# Patient Record
Sex: MALE
[De-identification: names, ages, dates, MRNs, and addresses within clinical notes are randomized per-mention and may not be internally consistent; named-entity substitution may affect disease eponyms.]

---

## 2020-11-04 ENCOUNTER — TRANSCRIPTION ENCOUNTER (OUTPATIENT)
Age: 60
End: 2020-11-04

## 2021-12-10 ENCOUNTER — APPOINTMENT (OUTPATIENT)
Dept: RHEUMATOLOGY | Facility: CLINIC | Age: 61
End: 2021-12-10

## 2023-08-01 ENCOUNTER — APPOINTMENT (OUTPATIENT)
Dept: GASTROENTEROLOGY | Facility: CLINIC | Age: 63
End: 2023-08-01

## 2024-08-15 ENCOUNTER — APPOINTMENT (OUTPATIENT)
Dept: NEUROLOGY | Facility: CLINIC | Age: 64
End: 2024-08-15

## 2024-08-15 VITALS
DIASTOLIC BLOOD PRESSURE: 68 MMHG | WEIGHT: 180 LBS | BODY MASS INDEX: 26.66 KG/M2 | HEART RATE: 67 BPM | SYSTOLIC BLOOD PRESSURE: 128 MMHG | HEIGHT: 69 IN | OXYGEN SATURATION: 97 %

## 2024-08-15 DIAGNOSIS — R56.9 UNSPECIFIED CONVULSIONS: ICD-10-CM

## 2024-08-15 DIAGNOSIS — R42 DIZZINESS AND GIDDINESS: ICD-10-CM

## 2024-08-15 PROCEDURE — 99204 OFFICE O/P NEW MOD 45 MIN: CPT

## 2024-08-15 PROCEDURE — G2211 COMPLEX E/M VISIT ADD ON: CPT

## 2024-08-15 NOTE — DISCUSSION/SUMMARY
[FreeTextEntry1] : The proposed plan includes ordering diagnostic tests, such as an MRI (including MRA to evaluate blood vessels), EEG to rule out seizures, and referral for vestibular therapy if dizziness persists. Additionally, the patient will be advised to take over-the-counter medications for headache management and follow up in a month after completing the tests.  - Plan : - Order MRI (including MRA) to evaluate the brain and blood vessels - Order EEG to rule out seizure disorder - Provide referral for vestibular therapy if dizziness persists - Recommend over-the-counter acetaminophen or ibuprofen for headache management - Follow-up appointment in one month after completing the tests

## 2024-08-15 NOTE — HISTORY OF PRESENT ILLNESS
[FreeTextEntry1] : This is a 64 year-old man with PMH MDS, diffuse large B-cell lymphoma who presents to the office today for evaluation of episodes of confusion/loss of consciousness, dizziness and abnormal sensation to head and right ear and headaches after hearing loud noises.  The patient reports feeling warmth inside the head and a sensation of something inside the ears. States this sensation generally occurs during or after feeling dizzy. The patient experiences dizziness when sitting as a passenger in a car. The patient also reports episodes of losing consciousness or becoming confused, with amnesia for these events lasting from one day up to two days. The patient denies any involuntary movements or body shaking during these episodes. Family History: Past Medical History: The patient reports a past eye injury where something dropped from the 6th floor and hit the left eye, causing abnormal eye shape/positioning. Impression: The healthcare provider suspects the dizziness may be related to vertigo or an inner ear issue, but wants to rule out any serious neurological conditions. The episodes of confusion and amnesia raise concern for possible seizures. - Review Of Systems : Review of Systems: The patient denies headaches with exertion or exercise, but reports headaches with loud noises, rating the pain as moderate in severity. The patient denies any other significant positive findings on review of systems.  December, January, 2 months and once in summer   warm sens in ear and head on right while used to get this while driving, dizziness eyes want to close feels like he needs water and warm 2-3 passing out took of clothes confused 1-2 days passed out unconsiousness took own life? confused  dizziness while driving loud noises cause headache    Subjective:

## 2024-08-15 NOTE — REASON FOR VISIT
[Initial Evaluation] : an initial evaluation [[employer] ] : provided by [employer]   [Interpreters_IDNumber] : [id number] [Interpreters_FullName] : Justin

## 2024-08-15 NOTE — REASON FOR VISIT
[Initial Evaluation] : an initial evaluation [[employer] ] : provided by [employer]   [Interpreters_IDNumber] : [id number] [Interpreters_FullName] : Justin

## 2024-08-29 ENCOUNTER — APPOINTMENT (OUTPATIENT)
Dept: NEUROLOGY | Facility: CLINIC | Age: 64
End: 2024-08-29

## 2024-08-29 PROCEDURE — 95816 EEG AWAKE AND DROWSY: CPT

## 2024-08-29 PROCEDURE — 93040 RHYTHM ECG WITH REPORT: CPT

## 2024-09-11 ENCOUNTER — OUTPATIENT (OUTPATIENT)
Dept: OUTPATIENT SERVICES | Facility: HOSPITAL | Age: 64
LOS: 1 days | End: 2024-09-11

## 2024-09-11 ENCOUNTER — APPOINTMENT (OUTPATIENT)
Dept: MRI IMAGING | Facility: CLINIC | Age: 64
End: 2024-09-11

## 2024-09-11 DIAGNOSIS — R56.9 UNSPECIFIED CONVULSIONS: ICD-10-CM

## 2024-09-11 DIAGNOSIS — R42 DIZZINESS AND GIDDINESS: ICD-10-CM

## 2024-09-12 RX ORDER — ALPRAZOLAM 0.5 MG/1
0.5 TABLET ORAL
Qty: 2 | Refills: 0 | Status: ACTIVE | COMMUNITY
Start: 2024-09-12 | End: 1900-01-01

## 2024-09-24 ENCOUNTER — APPOINTMENT (OUTPATIENT)
Dept: NEUROLOGY | Facility: CLINIC | Age: 64
End: 2024-09-24

## 2024-09-27 ENCOUNTER — APPOINTMENT (OUTPATIENT)
Dept: MRI IMAGING | Facility: CLINIC | Age: 64
End: 2024-09-27

## 2024-09-27 ENCOUNTER — OUTPATIENT (OUTPATIENT)
Dept: OUTPATIENT SERVICES | Facility: HOSPITAL | Age: 64
LOS: 1 days | End: 2024-09-27

## 2024-09-27 DIAGNOSIS — R42 DIZZINESS AND GIDDINESS: ICD-10-CM

## 2024-09-27 DIAGNOSIS — R56.9 UNSPECIFIED CONVULSIONS: ICD-10-CM

## 2024-10-01 ENCOUNTER — APPOINTMENT (OUTPATIENT)
Dept: NEUROLOGY | Facility: CLINIC | Age: 64
End: 2024-10-01

## 2025-04-13 ENCOUNTER — INPATIENT (INPATIENT)
Facility: HOSPITAL | Age: 65
LOS: 1 days | Discharge: HOME | DRG: 443 | End: 2025-04-15
Attending: HOSPITALIST | Admitting: EMERGENCY MEDICINE
Payer: MEDICARE

## 2025-04-13 VITALS
RESPIRATION RATE: 18 BRPM | TEMPERATURE: 98 F | HEART RATE: 92 BPM | OXYGEN SATURATION: 100 % | DIASTOLIC BLOOD PRESSURE: 84 MMHG | SYSTOLIC BLOOD PRESSURE: 138 MMHG

## 2025-04-13 LAB
ALBUMIN SERPL ELPH-MCNC: 2.6 G/DL — LOW (ref 3.3–5.2)
ALP SERPL-CCNC: 90 U/L — SIGNIFICANT CHANGE UP (ref 40–120)
ALT FLD-CCNC: 33 U/L — SIGNIFICANT CHANGE UP
AMMONIA BLD-MCNC: 222 UMOL/L — HIGH (ref 11–55)
ANION GAP SERPL CALC-SCNC: 9 MMOL/L — SIGNIFICANT CHANGE UP (ref 5–17)
ANISOCYTOSIS BLD QL: SLIGHT — SIGNIFICANT CHANGE UP
APTT BLD: 35.7 SEC — HIGH (ref 24.5–35.6)
AST SERPL-CCNC: 80 U/L — HIGH
BASOPHILS # BLD AUTO: 0.01 K/UL — SIGNIFICANT CHANGE UP (ref 0–0.2)
BASOPHILS # BLD MANUAL: 0 K/UL — SIGNIFICANT CHANGE UP (ref 0–0.2)
BASOPHILS NFR BLD AUTO: 0.5 % — SIGNIFICANT CHANGE UP (ref 0–2)
BASOPHILS NFR BLD MANUAL: 0 % — SIGNIFICANT CHANGE UP (ref 0–2)
BILIRUB SERPL-MCNC: 0.9 MG/DL — SIGNIFICANT CHANGE UP (ref 0.4–2)
BUN SERPL-MCNC: 11.3 MG/DL — SIGNIFICANT CHANGE UP (ref 8–20)
CALCIUM SERPL-MCNC: 8.3 MG/DL — LOW (ref 8.4–10.5)
CHLORIDE SERPL-SCNC: 109 MMOL/L — HIGH (ref 96–108)
CO2 SERPL-SCNC: 20 MMOL/L — LOW (ref 22–29)
CREAT SERPL-MCNC: 0.53 MG/DL — SIGNIFICANT CHANGE UP (ref 0.5–1.3)
EGFR: 110 ML/MIN/1.73M2 — SIGNIFICANT CHANGE UP
EGFR: 110 ML/MIN/1.73M2 — SIGNIFICANT CHANGE UP
EOSINOPHIL # BLD AUTO: 0.04 K/UL — SIGNIFICANT CHANGE UP (ref 0–0.5)
EOSINOPHIL # BLD MANUAL: 0.02 K/UL — SIGNIFICANT CHANGE UP (ref 0–0.5)
EOSINOPHIL NFR BLD AUTO: 1.9 % — SIGNIFICANT CHANGE UP (ref 0–6)
EOSINOPHIL NFR BLD MANUAL: 0.9 % — SIGNIFICANT CHANGE UP (ref 0–6)
GIANT PLATELETS BLD QL SMEAR: PRESENT
GLUCOSE SERPL-MCNC: 117 MG/DL — HIGH (ref 70–99)
HCT VFR BLD CALC: 32.6 % — LOW (ref 39–50)
HGB BLD-MCNC: 11.3 G/DL — LOW (ref 13–17)
IMM GRANULOCYTES # BLD AUTO: 0 K/UL — SIGNIFICANT CHANGE UP (ref 0–0.07)
IMM GRANULOCYTES NFR BLD AUTO: 0 % — SIGNIFICANT CHANGE UP (ref 0–0.9)
IMMATURE PLATELET FRACTION #: 1.6 K/UL — LOW (ref 3.9–12.5)
IMMATURE PLATELET FRACTION %: 2.7 % — SIGNIFICANT CHANGE UP (ref 1.6–7.1)
INR BLD: 1.34 RATIO — HIGH (ref 0.85–1.16)
LIDOCAIN IGE QN: 26 U/L — SIGNIFICANT CHANGE UP (ref 22–51)
LYMPHOCYTES # BLD AUTO: 0.69 K/UL — LOW (ref 1–3.3)
LYMPHOCYTES # BLD MANUAL: 0.27 K/UL — LOW (ref 1–3.3)
LYMPHOCYTES NFR BLD AUTO: 32.4 % — SIGNIFICANT CHANGE UP (ref 13–44)
LYMPHOCYTES NFR BLD MANUAL: 12.8 % — LOW (ref 13–44)
MACROCYTES BLD QL: SLIGHT — SIGNIFICANT CHANGE UP
MANUAL REACTIVE LYMPHOCYTES #: 0.04 K/UL — SIGNIFICANT CHANGE UP (ref 0–0.63)
MCHC RBC-ENTMCNC: 34.7 G/DL — SIGNIFICANT CHANGE UP (ref 32–36)
MCHC RBC-ENTMCNC: 34.7 PG — HIGH (ref 27–34)
MCV RBC AUTO: 100 FL — SIGNIFICANT CHANGE UP (ref 80–100)
MONOCYTES # BLD AUTO: 0.33 K/UL — SIGNIFICANT CHANGE UP (ref 0–0.9)
MONOCYTES # BLD MANUAL: 0.53 K/UL — SIGNIFICANT CHANGE UP (ref 0–0.9)
MONOCYTES NFR BLD AUTO: 15.5 % — HIGH (ref 2–14)
MONOCYTES NFR BLD MANUAL: 24.8 % — HIGH (ref 2–14)
NEUTROPHILS # BLD AUTO: 1.06 K/UL — LOW (ref 1.8–7.4)
NEUTROPHILS # BLD MANUAL: 1.27 K/UL — LOW (ref 1.8–7.4)
NEUTROPHILS NFR BLD AUTO: 49.7 % — SIGNIFICANT CHANGE UP (ref 43–77)
NEUTROPHILS NFR BLD MANUAL: 59.8 % — SIGNIFICANT CHANGE UP (ref 43–77)
NRBC # BLD AUTO: 0 K/UL — SIGNIFICANT CHANGE UP (ref 0–0)
NRBC # FLD: 0 K/UL — SIGNIFICANT CHANGE UP (ref 0–0)
NRBC BLD AUTO-RTO: 0 /100 WBCS — SIGNIFICANT CHANGE UP (ref 0–0)
PLAT MORPH BLD: NORMAL — SIGNIFICANT CHANGE UP
PLATELET # BLD AUTO: 58 K/UL — LOW (ref 150–400)
PMV BLD: 11.1 FL — SIGNIFICANT CHANGE UP (ref 7–13)
POLYCHROMASIA BLD QL SMEAR: SLIGHT — SIGNIFICANT CHANGE UP
POTASSIUM SERPL-MCNC: 5.3 MMOL/L — SIGNIFICANT CHANGE UP (ref 3.5–5.3)
POTASSIUM SERPL-SCNC: 5.3 MMOL/L — SIGNIFICANT CHANGE UP (ref 3.5–5.3)
PROT SERPL-MCNC: 7.6 G/DL — SIGNIFICANT CHANGE UP (ref 6.6–8.7)
PROTHROM AB SERPL-ACNC: 15.5 SEC — HIGH (ref 9.9–13.4)
RBC # BLD: 3.26 M/UL — LOW (ref 4.2–5.8)
RBC # FLD: 14.2 % — SIGNIFICANT CHANGE UP (ref 10.3–14.5)
RBC BLD AUTO: ABNORMAL
SMUDGE CELLS # BLD: PRESENT
SODIUM SERPL-SCNC: 137 MMOL/L — SIGNIFICANT CHANGE UP (ref 135–145)
SPHEROCYTES BLD QL SMEAR: SLIGHT — SIGNIFICANT CHANGE UP
TROPONIN T, HIGH SENSITIVITY RESULT: 9 NG/L — SIGNIFICANT CHANGE UP (ref 0–51)
VARIANT LYMPHS # BLD: 1.7 % — SIGNIFICANT CHANGE UP (ref 0–6)
VARIANT LYMPHS NFR BLD MANUAL: 1.7 % — SIGNIFICANT CHANGE UP (ref 0–6)
WBC # BLD: 2.13 K/UL — LOW (ref 3.8–10.5)
WBC # FLD AUTO: 2.13 K/UL — LOW (ref 3.8–10.5)

## 2025-04-13 PROCEDURE — 93010 ELECTROCARDIOGRAM REPORT: CPT

## 2025-04-13 PROCEDURE — 99285 EMERGENCY DEPT VISIT HI MDM: CPT

## 2025-04-13 RX ORDER — HALOPERIDOL 10 MG/1
2.5 TABLET ORAL ONCE
Refills: 0 | Status: COMPLETED | OUTPATIENT
Start: 2025-04-13 | End: 2025-04-13

## 2025-04-13 RX ORDER — LACTULOSE 10 G/15ML
20 SOLUTION ORAL ONCE
Refills: 0 | Status: COMPLETED | OUTPATIENT
Start: 2025-04-13 | End: 2025-04-13

## 2025-04-13 RX ADMIN — HALOPERIDOL 2.5 MILLIGRAM(S): 10 TABLET ORAL at 22:24

## 2025-04-13 RX ADMIN — LACTULOSE 20 GRAM(S): 10 SOLUTION ORAL at 21:04

## 2025-04-13 RX ADMIN — HALOPERIDOL 2.5 MILLIGRAM(S): 10 TABLET ORAL at 23:18

## 2025-04-13 NOTE — ED ADULT NURSE NOTE - OBJECTIVE STATEMENT
pt to ED with c/o chest pain and abdominal pain. pain started this afternoon while at rest. pressure like pain, denies radiation. did not take any medications. denies n/v/d. reports pain is currently subsided. placed on telebox.

## 2025-04-13 NOTE — ED ADULT NURSE NOTE - NSFALLUNIVINTERV_ED_ALL_ED
Bed/Stretcher in lowest position, wheels locked, appropriate side rails in place/Call bell, personal items and telephone in reach/Instruct patient to call for assistance before getting out of bed/chair/stretcher/Non-slip footwear applied when patient is off stretcher/[place] to call system/Physically safe environment - no spills, clutter or unnecessary equipment/Purposeful proactive rounding/Room/bathroom lighting operational, light cord in reach

## 2025-04-13 NOTE — ED ADULT NURSE REASSESSMENT NOTE - NS ED NURSE REASSESS COMMENT FT1
Assumed care of pt from ashley HARO at 1915. Pt is resting comfortably in stretcher. NAD. Pt is A&Ox2. Respirations are even and unlabored on ra. Pt awaiting ct. family at bedside.

## 2025-04-13 NOTE — ED ADULT NURSE REASSESSMENT NOTE - NS ED NURSE REASSESS COMMENT FT1
report given to foster 2 brendan bowie. pt a&oX1. resp even and unlabored on ra. nsr on tele. be din lowest position with wheels locked.

## 2025-04-13 NOTE — ED PROVIDER NOTE - OBJECTIVE STATEMENT
65-year-old male past medical history of hepatitis, malignancy not currently on any treatment presents with abdominal pain and chest pain.  Patient reports that this afternoon while seated he started developing epigastric abdominal pain and a chest pain.  The pain was a pressure, no radiation, no alleviating or exacerbating factors.  There is no associated shortness of breath, vomiting, diarrhea, melena, dysuria, fevers, cough.  Of note patient reportedly had episodes of dizziness last week, was referred by PMD for an outpatient CAT scan and cardiology appointment which she has not yet been able to get either

## 2025-04-13 NOTE — ED ADULT NURSE REASSESSMENT NOTE - NS ED NURSE REASSESS COMMENT FT1
rn called to bedside by family. pt repeatedly trying to get out of bed. pt a&oX1. pt reoriented and educated about purpose of staying in bed. pt still trying to get out of bed. md srivastava made aware. pt medicated per md orders. bed in lowest position with wheels locked. safety maintained.

## 2025-04-14 DIAGNOSIS — K76.82 HEPATIC ENCEPHALOPATHY: ICD-10-CM

## 2025-04-14 LAB
AMMONIA BLD-MCNC: 117 UMOL/L — HIGH (ref 11–55)
TROPONIN T, HIGH SENSITIVITY RESULT: 11 NG/L — SIGNIFICANT CHANGE UP (ref 0–51)

## 2025-04-14 PROCEDURE — 71275 CT ANGIOGRAPHY CHEST: CPT | Mod: 26

## 2025-04-14 PROCEDURE — 99223 1ST HOSP IP/OBS HIGH 75: CPT

## 2025-04-14 PROCEDURE — 70450 CT HEAD/BRAIN W/O DYE: CPT | Mod: 26

## 2025-04-14 PROCEDURE — 99222 1ST HOSP IP/OBS MODERATE 55: CPT

## 2025-04-14 PROCEDURE — 74177 CT ABD & PELVIS W/CONTRAST: CPT | Mod: 26

## 2025-04-14 RX ORDER — ACETAMINOPHEN 500 MG/5ML
650 LIQUID (ML) ORAL EVERY 6 HOURS
Refills: 0 | Status: DISCONTINUED | OUTPATIENT
Start: 2025-04-14 | End: 2025-04-15

## 2025-04-14 RX ORDER — ENOXAPARIN SODIUM 100 MG/ML
40 INJECTION SUBCUTANEOUS EVERY 24 HOURS
Refills: 0 | Status: DISCONTINUED | OUTPATIENT
Start: 2025-04-14 | End: 2025-04-14

## 2025-04-14 RX ORDER — MAGNESIUM, ALUMINUM HYDROXIDE 200-200 MG
30 TABLET,CHEWABLE ORAL EVERY 4 HOURS
Refills: 0 | Status: DISCONTINUED | OUTPATIENT
Start: 2025-04-14 | End: 2025-04-15

## 2025-04-14 RX ORDER — LACTULOSE 10 G/15ML
20 SOLUTION ORAL
Refills: 0 | Status: DISCONTINUED | OUTPATIENT
Start: 2025-04-14 | End: 2025-04-15

## 2025-04-14 RX ORDER — DIAZEPAM 2 MG/1
5 TABLET ORAL ONCE
Refills: 0 | Status: DISCONTINUED | OUTPATIENT
Start: 2025-04-14 | End: 2025-04-14

## 2025-04-14 RX ORDER — TAPENTADOL HYDROCHLORIDE 200 MG/1
1 TABLET, FILM COATED, EXTENDED RELEASE ORAL
Refills: 0 | DISCHARGE

## 2025-04-14 RX ORDER — ONDANSETRON HCL/PF 4 MG/2 ML
4 VIAL (ML) INJECTION EVERY 8 HOURS
Refills: 0 | Status: DISCONTINUED | OUTPATIENT
Start: 2025-04-14 | End: 2025-04-15

## 2025-04-14 RX ORDER — MELATONIN 5 MG
3 TABLET ORAL AT BEDTIME
Refills: 0 | Status: DISCONTINUED | OUTPATIENT
Start: 2025-04-14 | End: 2025-04-15

## 2025-04-14 RX ADMIN — LACTULOSE 20 GRAM(S): 10 SOLUTION ORAL at 23:43

## 2025-04-14 RX ADMIN — ENOXAPARIN SODIUM 40 MILLIGRAM(S): 100 INJECTION SUBCUTANEOUS at 06:02

## 2025-04-14 RX ADMIN — Medication 40 MILLIGRAM(S): at 09:05

## 2025-04-14 RX ADMIN — Medication 105 MILLIGRAM(S): at 11:57

## 2025-04-14 RX ADMIN — LACTULOSE 20 GRAM(S): 10 SOLUTION ORAL at 06:02

## 2025-04-14 RX ADMIN — Medication 105 MILLIGRAM(S): at 22:12

## 2025-04-14 RX ADMIN — LACTULOSE 20 GRAM(S): 10 SOLUTION ORAL at 18:20

## 2025-04-14 RX ADMIN — DIAZEPAM 5 MILLIGRAM(S): 2 TABLET ORAL at 02:57

## 2025-04-14 RX ADMIN — LACTULOSE 20 GRAM(S): 10 SOLUTION ORAL at 11:57

## 2025-04-14 NOTE — PHYSICAL THERAPY INITIAL EVALUATION ADULT - LEVEL OF INDEPENDENCE: STAIR NEGOTIATION, REHAB EVAL
unsafe to trial at this time due to strength deficits and difficulty following multi-step commands/unable to perform

## 2025-04-14 NOTE — H&P ADULT - NSHPLABSRESULTS_GEN_ALL_CORE
11.3   2.13  )-----------( 58       ( 13 Apr 2025 18:35 )             32.6     13 Apr 2025 18:35    137    |  109    |  11.3   ----------------------------<  117    5.3     |  20.0   |  0.53     Ca    8.3        13 Apr 2025 18:35    TPro  7.6    /  Alb  2.6    /  TBili  0.9    /  DBili  x      /  AST  80     /  ALT  33     /  AlkPhos  90     13 Apr 2025 18:35    PT/INR - ( 13 Apr 2025 18:35 )   PT: 15.5 sec;   INR: 1.34 ratio         PTT - ( 13 Apr 2025 18:35 )  PTT:35.7 sec  CAPILLARY BLOOD GLUCOSE        LIVER FUNCTIONS - ( 13 Apr 2025 18:35 )  Alb: 2.6 g/dL / Pro: 7.6 g/dL / ALK PHOS: 90 U/L / ALT: 33 U/L / AST: 80 U/L / GGT: x           Urinalysis Basic - ( 13 Apr 2025 18:35 )    Color: x / Appearance: x / SG: x / pH: x  Gluc: 117 mg/dL / Ketone: x  / Bili: x / Urobili: x   Blood: x / Protein: x / Nitrite: x   Leuk Esterase: x / RBC: x / WBC x   Sq Epi: x / Non Sq Epi: x / Bacteria: x

## 2025-04-14 NOTE — H&P ADULT - ASSESSMENT
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 [facility], anticipate 2 days

## 2025-04-14 NOTE — CONSULT NOTE ADULT - NS ATTEND AMEND GEN_ALL_CORE FT
64 yo M with PMH HCV cirrhosis ( MELD 3.0 10, CP B ) d/b HE who presents for AMS. Patient reports feeling confused and per chart has not been taking his lactulose as prescribed. Abdomen soft, nondistended, nontender. Exam with confusion, asterixis, abdomen soft, nondistended. Imaging concerning for cirrhotic liver, splenomegaly and no ascites noted. Continue lactulose to ensure 3 BMs per day. Can add rifaximin BID. Continue to monitor mental status for improvement. No need to trend ammonia- would continue lactulose/rifaxamin until mental status normalizes

## 2025-04-14 NOTE — CONSULT NOTE ADULT - SUBJECTIVE AND OBJECTIVE BOX
Chief Complaint:  Patient is a 65y old  Male who presents with a chief complaint of Hepatic Encephalopathy (14 Apr 2025 01:59)      HPI: 64 yo male with pmhx MDS, Diffuse Large B cell lymphoma not on treatment, Cirrhosis 2/2 HCV presenting to the ED with family for confusion. GI asked to consult for cirrhosis and hepatic encephalopathy. Pt came in for epigastric abdominal pain and a chest pain that developed yesterday afternoon.  The pain was a pressure, no radiation, no alleviating or exacerbating factors.  There is no associated shortness of breath, vomiting, diarrhea, melena, dysuria, fevers, cough. History obtained from the chart as pt is confused and lethargic at bedside. ER obtained history from Daughter in law. Apparently patient has had intermittent confusion x 6-7 months. This particular episode of confusion has been ongoing x 1 day.  He has a history of cirrhosis. Is supposed to take lactulose at home but obnly takes it when he is feeling constipated. In the ER ammonia was noted to be 222. CT Abdomen and Pelvis w/ IV Cont (04.14.25) showed Cirrhotic liver, splenomegaly but no ascites.         PAST MEDICAL & SURGICAL HISTORY:  HCV (hepatitis C virus)      Cirrhosis      Diffuse large B cell lymphoma      No significant past surgical history          REVIEW OF SYSTEMS:   unable to obtain     MEDICATIONS:   MEDICATIONS  (STANDING):  lactulose Syrup 20 Gram(s) Oral four times a day  pantoprazole    Tablet 40 milliGRAM(s) Oral before breakfast  rifAXIMin 550 milliGRAM(s) Oral two times a day  thiamine IVPB 500 milliGRAM(s) IV Intermittent every 8 hours    MEDICATIONS  (PRN):  acetaminophen     Tablet .. 650 milliGRAM(s) Oral every 6 hours PRN Temp greater or equal to 38C (100.4F), Mild Pain (1 - 3)  aluminum hydroxide/magnesium hydroxide/simethicone Suspension 30 milliLiter(s) Oral every 4 hours PRN Dyspepsia  melatonin 3 milliGRAM(s) Oral at bedtime PRN Insomnia  ondansetron Injectable 4 milliGRAM(s) IV Push every 8 hours PRN Nausea and/or Vomiting          DIET:  Diet, DASH/TLC:   Sodium & Cholesterol Restricted  1500mL Fluid Restriction ([id number]) (04-14-25 @ 07:34) [Active]          ALLERGIES:   Allergies    No Known Allergies    Intolerances        Substance Use:   (  ) never used  (  ) other:  Tobacco Usage:  (   ) never smoked   (   ) former smoker   (   ) current smoker  (     ) pack year  (        ) last cigarette date  Alcohol Usage:    Family History   IBD (  ) Yes   (  ) No  GI Malignancy (  )  Yes    (  ) No    Health Management  Last Colonoscopy:  Last Endoscopy:     VITAL SIGNS:   Vital Signs Last 24 Hrs  T(C): 36.6 (14 Apr 2025 08:19), Max: 36.9 (13 Apr 2025 17:14)  T(F): 97.9 (14 Apr 2025 08:19), Max: 98.5 (13 Apr 2025 17:14)  HR: 106 (14 Apr 2025 08:19) (90 - 106)  BP: 134/80 (14 Apr 2025 08:19) (134/80 - 172/93)  BP(mean): 108 (14 Apr 2025 06:17) (101 - 108)  RR: 18 (14 Apr 2025 08:19) (18 - 22)  SpO2: 97% (14 Apr 2025 08:19) (97% - 100%)    Parameters below as of 14 Apr 2025 08:19  Patient On (Oxygen Delivery Method): room air      I&O's Summary      PHYSICAL EXAM:   GENERAL: lethargic   HEENT:  NC/AT, conjunctiva clear, sclera anicteric  ABDOMEN:  Soft, non-tender, non-distended, normoactive bowel sounds, no rebound or guarding  EXTREMITIES: No edema  SKIN:  Warm, dry  NEURO:  A&Ox0    LABS:                        11.3   2.13  )-----------( 58       ( 13 Apr 2025 18:35 )             32.6     Hemoglobin: 11.3 g/dL (04-13-25 @ 18:35)    04-13    137  |  109[H]  |  11.3  ----------------------------<  117[H]  5.3   |  20.0[L]  |  0.53    Ca    8.3[L]      13 Apr 2025 18:35    TPro  7.6  /  Alb  2.6[L]  /  TBili  0.9  /  DBili  x   /  AST  80[H]  /  ALT  33  /  AlkPhos  90  04-13    LIVER FUNCTIONS - ( 13 Apr 2025 18:35 )  Alb: 2.6 g/dL / Pro: 7.6 g/dL / ALK PHOS: 90 U/L / ALT: 33 U/L / AST: 80 U/L / GGT: x             PT/INR - ( 13 Apr 2025 18:35 )   PT: 15.5 sec;   INR: 1.34 ratio         PTT - ( 13 Apr 2025 18:35 )  PTT:35.7 sec    Lipase: 26 U/L (04-13-25 @ 18:35)          Ammonia, Serum: 117 umol/L (04-14-25 @ 08:11)  Ammonia, Serum: 222 umol/L (04-13-25 @ 18:35)        RADIOLOGY & ADDITIONAL STUDIES:      ACC: [id number] EXAM:  CT ABDOMEN AND PELVIS IC   ORDERED BY: EDDIE CHIU     PROCEDURE DATE:  04/14/2025          INTERPRETATION:  PROCEDURE INFORMATION:  Exam: CT Abdomen And Pelvis  Exam date and time: 4/14/2025 1:06 AM  Age: 65 years old  Clinical indication: Epigastric abd pain    TECHNIQUE:  Imaging protocol: Computed tomography of the abdomen and pelvis with   contrast. Sagittal and coronal reconstructions performed. Exam degraded   by motion unsharpness.  Contrast material: IV: IV CONTRAST DOCUMENTED IN UNLINKED CONCURRENT EXAM;    COMPARISON:  CT ANGIO CHEST PULMONARY ARTERY 4/14/2025 1:06 AM    FINDINGS:  Liver: Surface irregularity and volume loss c/w cirrhosis.  Gallbladder and biliary ducts: Normal. No calcified stones. No ductal   dilation.  Pancreas: Unremarkable.  Spleen: Splenomegaly (14.9 cm max sagittal).  Adrenal glands: Normal. No mass.  Kidneys and ureters: Normal. No hydronephrosis.  Stomach and bowel: RLQ postsurgical changes. No bowel wall thickening or   intestinal obstruction. No pneumatosis or portal/mesenteric venous gas.   Nonvisualized appendix. No appendicitis.    Intraperitoneal space: No ascites, pneumoperitoneum or abscess.  Vasculature: Atherosclerotic changes.  Lymph nodes: Unremarkable.  Urinary bladder: Unremarkable as visualized.  Reproductive: Mild prostate enlargement  Bones/joints: Benign sclerotic lesion in the proximal left femur. Lower   thoracic/lumbar spondylosis  Soft tissues: Unremarkable.    IMPRESSION:  1.  Limited exam , degraded by motion unsharpness.  2.  No acute abdominal/pelvic pathology.  3.  Cirrhotic liver, splenomegaly  A preliminary report was given by [facility] RADIOLOGY: GLADYS CHANG M.D    --- End of Report ---            PJ DANIELS MD; Attending Radiologist  This document has been electronically signed. 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 GI asked to consult for cirrhosis and hepatic encephalopathy. Pt came in for epigastric abdominal pain and a chest pain that developed yesterday afternoon.  The pain was a pressure, no radiation, no alleviating or exacerbating factors.  There is no associated shortness of breath, vomiting, diarrhea, melena, dysuria, fevers, cough. History obtained from the chart as pt is confused and lethargic at bedside. ER obtained history from Daughter in law. Apparently patient has had intermittent confusion x 6-7 months. This particular episode of confusion has been ongoing x 1 day.  He has a history of cirrhosis. Is supposed to take lactulose at home but obnly takes it when he is feeling constipated. In the ER ammonia was noted to be 222. CT Abdomen and Pelvis w/ IV Cont (04.14.25) showed Cirrhotic liver, splenomegaly but no ascites.         PAST MEDICAL & SURGICAL HISTORY:  HCV (hepatitis C virus)      Cirrhosis      Diffuse large B cell lymphoma      No significant past surgical history          REVIEW OF SYSTEMS:   unable to obtain     MEDICATIONS:   MEDICATIONS  (STANDING):  lactulose Syrup 20 Gram(s) Oral four times a day  pantoprazole    Tablet 40 milliGRAM(s) Oral before breakfast  rifAXIMin 550 milliGRAM(s) Oral two times a day  thiamine IVPB 500 milliGRAM(s) IV Intermittent every 8 hours    MEDICATIONS  (PRN):  acetaminophen     Tablet .. 650 milliGRAM(s) Oral every 6 hours PRN Temp greater or equal to 38C (100.4F), Mild Pain (1 - 3)  aluminum hydroxide/magnesium hydroxide/simethicone Suspension 30 milliLiter(s) Oral every 4 hours PRN Dyspepsia  melatonin 3 milliGRAM(s) Oral at bedtime PRN Insomnia  ondansetron Injectable 4 milliGRAM(s) IV Push every 8 hours PRN Nausea and/or Vomiting          DIET:  Diet, DASH/TLC:   Sodium & Cholesterol Restricted  1500mL Fluid Restriction ([id number]) (04-14-25 @ 07:34) [Active]          ALLERGIES:   Allergies    No Known Allergies    Intolerances        Substance Use:   (  ) never used  (  ) other:  Tobacco Usage:  (   ) never smoked   (   ) former smoker   (   ) current smoker  (     ) pack year  (        ) last cigarette date  Alcohol Usage:    Family History   IBD (  ) Yes   (  ) No  GI Malignancy (  )  Yes    (  ) No    Health Management  Last Colonoscopy:  Last Endoscopy:     VITAL SIGNS:   Vital Signs Last 24 Hrs  T(C): 36.6 (14 Apr 2025 08:19), Max: 36.9 (13 Apr 2025 17:14)  T(F): 97.9 (14 Apr 2025 08:19), Max: 98.5 (13 Apr 2025 17:14)  HR: 106 (14 Apr 2025 08:19) (90 - 106)  BP: 134/80 (14 Apr 2025 08:19) (134/80 - 172/93)  BP(mean): 108 (14 Apr 2025 06:17) (101 - 108)  RR: 18 (14 Apr 2025 08:19) (18 - 22)  SpO2: 97% (14 Apr 2025 08:19) (97% - 100%)    Parameters below as of 14 Apr 2025 08:19  Patient On (Oxygen Delivery Method): room air      I&O's Summary      PHYSICAL EXAM:   GENERAL: lethargic   HEENT:  NC/AT, conjunctiva clear, sclera anicteric  ABDOMEN:  Soft, non-tender, non-distended, normoactive bowel sounds, no rebound or guarding  EXTREMITIES: No edema  SKIN:  Warm, dry  NEURO:  A&Ox0    LABS:                        11.3   2.13  )-----------( 58       ( 13 Apr 2025 18:35 )             32.6     Hemoglobin: 11.3 g/dL (04-13-25 @ 18:35)    04-13    137  |  109[H]  |  11.3  ----------------------------<  117[H]  5.3   |  20.0[L]  |  0.53    Ca    8.3[L]      13 Apr 2025 18:35    TPro  7.6  /  Alb  2.6[L]  /  TBili  0.9  /  DBili  x   /  AST  80[H]  /  ALT  33  /  AlkPhos  90  04-13    LIVER FUNCTIONS - ( 13 Apr 2025 18:35 )  Alb: 2.6 g/dL / Pro: 7.6 g/dL / ALK PHOS: 90 U/L / ALT: 33 U/L / AST: 80 U/L / GGT: x             PT/INR - ( 13 Apr 2025 18:35 )   PT: 15.5 sec;   INR: 1.34 ratio         PTT - ( 13 Apr 2025 18:35 )  PTT:35.7 sec    Lipase: 26 U/L (04-13-25 @ 18:35)          Ammonia, Serum: 117 umol/L (04-14-25 @ 08:11)  Ammonia, Serum: 222 umol/L (04-13-25 @ 18:35)        RADIOLOGY & ADDITIONAL STUDIES:      ACC: [id number] EXAM:  CT ABDOMEN AND PELVIS IC   ORDERED BY: EDDIE CHIU     PROCEDURE DATE:  04/14/2025          INTERPRETATION:  PROCEDURE INFORMATION:  Exam: CT Abdomen And Pelvis  Exam date and time: 4/14/2025 1:06 AM  Age: 65 years old  Clinical indication: Epigastric abd pain    TECHNIQUE:  Imaging protocol: Computed tomography of the abdomen and pelvis with   contrast. Sagittal and coronal reconstructions performed. Exam degraded   by motion unsharpness.  Contrast material: IV: IV CONTRAST DOCUMENTED IN UNLINKED CONCURRENT EXAM;    COMPARISON:  CT ANGIO CHEST PULMONARY ARTERY 4/14/2025 1:06 AM    FINDINGS:  Liver: Surface irregularity and volume loss c/w cirrhosis.  Gallbladder and biliary ducts: Normal. No calcified stones. No ductal   dilation.  Pancreas: Unremarkable.  Spleen: Splenomegaly (14.9 cm max sagittal).  Adrenal glands: Normal. No mass.  Kidneys and ureters: Normal. No hydronephrosis.  Stomach and bowel: RLQ postsurgical changes. No bowel wall thickening or   intestinal obstruction. No pneumatosis or portal/mesenteric venous gas.   Nonvisualized appendix. No appendicitis.    Intraperitoneal space: No ascites, pneumoperitoneum or abscess.  Vasculature: Atherosclerotic changes.  Lymph nodes: Unremarkable.  Urinary bladder: Unremarkable as visualized.  Reproductive: Mild prostate enlargement  Bones/joints: Benign sclerotic lesion in the proximal left femur. Lower   thoracic/lumbar spondylosis  Soft tissues: Unremarkable.    IMPRESSION:  1.  Limited exam , degraded by motion unsharpness.  2.  No acute abdominal/pelvic pathology.  3.  Cirrhotic liver, splenomegaly  A preliminary report was given by [facility] RADIOLOGY: GLADYS CHANG M.D    --- End of Report ---            PJ DANIELS MD; Attending Radiologist  This document has been electronically signed. Apr 14 2025  6:47AM  04-14-25 @ 01:38

## 2025-04-14 NOTE — PHYSICAL THERAPY INITIAL EVALUATION ADULT - PERTINENT HX OF CURRENT PROBLEM, REHAB EVAL
As per MD note: 64 yo male with pmhx MDS, Diffuse Large B cell lymphoma not on treatment, Cirrhosis 2/2 HCV presenting to the ED with family for confusion. History obtained from the chart/daughter in law as patient is confused and cannot give history. Daughter in law states that the patient has had intermittent confusion x 6-7 months. This particular episode of confusion has been ongoing x 1 day.

## 2025-04-14 NOTE — H&P ADULT - NSHPPHYSICALEXAM_GEN_ALL_CORE
Vital Signs Last 24 Hrs  T(C): 36.8 (13 Apr 2025 23:40), Max: 36.9 (13 Apr 2025 17:14)  T(F): 98.3 (13 Apr 2025 23:40), Max: 98.5 (13 Apr 2025 17:14)  HR: 106 (13 Apr 2025 23:40) (90 - 106)  BP: 153/84 (13 Apr 2025 23:40) (138/84 - 172/93)  BP(mean): 101 (13 Apr 2025 23:40) (101 - 101)  RR: 22 (13 Apr 2025 23:40) (18 - 22)  SpO2: 99% (13 Apr 2025 23:40) (98% - 100%)    Parameters below as of 13 Apr 2025 23:40  Patient On (Oxygen Delivery Method): room air    General: Age-appearing, in no acute distress  Head: Normocephalic, atraumatic  ENMT: EOMI, neck supple  Cardiovascular: +S1, S2; Regular rate and rhythm, no murmurs, rubs, gallops  Respiratory: CTA BL, no wheezes, rales, rhonchi  Gastrointestinal: Abdomen soft, non-tender, +BS in all 4 quadrants  Extremities: No clubbing, cyanosis, or edema  Vascular: 2+ pulses, cap refill < 2 seconds  Neuro: Non-focal, AAOx4, sensation intact BL  Musculoskeletal: Normal tone, no deformities  Skin: Warm, dry; no acute rash seen  Psych: Appropriate, cooperative Vital Signs Last 24 Hrs  T(C): 36.8 (13 Apr 2025 23:40), Max: 36.9 (13 Apr 2025 17:14)  T(F): 98.3 (13 Apr 2025 23:40), Max: 98.5 (13 Apr 2025 17:14)  HR: 106 (13 Apr 2025 23:40) (90 - 106)  BP: 153/84 (13 Apr 2025 23:40) (138/84 - 172/93)  BP(mean): 101 (13 Apr 2025 23:40) (101 - 101)  RR: 22 (13 Apr 2025 23:40) (18 - 22)  SpO2: 99% (13 Apr 2025 23:40) (98% - 100%)    Parameters below as of 13 Apr 2025 23:40  Patient On (Oxygen Delivery Method): room air    General: Age-appearing, in no acute distress  Head: Normocephalic, atraumatic  ENMT: EOMI, neck supple  Cardiovascular: +S1, S2; Regular rate and rhythm, no murmurs, rubs, gallops  Respiratory: CTA BL, no wheezes, rales, rhonchi  Gastrointestinal: Abdomen soft, non-tender, +BS in all 4 quadrants  Extremities: No clubbing, cyanosis, or edema  Vascular: 2+ pulses, cap refill < 2 seconds  Neuro: Non-focal, AAOx0; not answering questions/opening eyes but responding verbally "yes" to name  Musculoskeletal: Normal tone, no deformities  Skin: Warm, dry; no acute rash seen  Psych: Confused, restless

## 2025-04-14 NOTE — ED CLERICAL - NS ED CLERK UNITS
Virtual Visit Details    Type of service:  Video Visit   Video Start Time:  1130  Video End Time: 1159    Originating Location (pt. Location): Home  Distant Location (provider location):  Off-site  Platform used for Video Visit: [facility]       Psychiatry Clinic Progress Note                                                                  Patient Name: Hiwot Gandhi  YOB: 1984  MRN: [id number]  Date of Service:  04/02/2024  Last Seen:12/20/2023    Hiwot Gandhi is a 39 year old person assigned female at birth, identifies as nonbinary who uses the name Jessica and pronoun romain.        Jessica Gandhi is a 39 year old year old adult who presents for ongoing psychiatric care.  Jessica Gandhi was last seen on 12/20/difficulties Jan-March. Trauma anniversary: Parker  and July-Sept (was checked today: Adderall 3/17, 2/19, 1/17, Gabapentin 3/7, 1/31, 12/21Klonopin 1/3, testosterone 1/31 11/1/2023    11:41 AM 12/20/2023    10:20 AM 4/1/GABY 7 Today: N/A      2/15/2023    12:27 PM 9/14/2023    10:57 AM 9/15/2023     1:58 PM   GABY-11/07/23  1218 08/29/23  1214 02/08/22  1303 12/06/Vivien Bailon CNP,  04/02/2024

## 2025-04-14 NOTE — PATIENT PROFILE ADULT - DO YOU FEEL THREATENED BY OTHERS?
no no rales/no chest wall tenderness/no intercostal retractions/respirations non-labored/airway patent/no wheezes/no rhonchi/breath sounds equal/clear to auscultation bilaterally

## 2025-04-14 NOTE — H&P ADULT - HISTORY OF PRESENT ILLNESS
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 History obtained from the chart/daughter in law as patient is confused and cannot give history. Daughter in law states that the patient has had intermittent confusion x 6-7 months. This particular episode of confusion has been ongoing x 1 day. Daughter in law is a poor historian, but states patient also complained of "heart pain" since yesterday. On my eval of the patient, he response yes verbally when you state his name, however, does not open his eyes. He is slightly restless in the bed and per 1:1 was just medicated with Valium as he was trying to get out of bed and urinate on the floor.

## 2025-04-14 NOTE — PHYSICAL THERAPY INITIAL EVALUATION ADULT - ADDITIONAL COMMENTS
as per pt (clarification needed on PLOF) and care coordination note, pt lives in private house with his family, unsure if they are able to assist, has 2STE, owns cane and RW (pt stated he uses a RW)

## 2025-04-14 NOTE — PATIENT PROFILE ADULT - FUNCTIONAL ASSESSMENT - DAILY ACTIVITY 4.
REASON FOR VISIT: Ball of left foot swollen and painful    DATE OF APPT: 3/20/2025   NOTES (FOR ALL VISITS) STATUS DETAILS   OFFICE NOTE from referring provider N/A Self   EMG N/A    MEDICATION LIST N/A    IMAGING  (FOR ALL VISITS)     XR Internal [facility]  XR Foot left 3/14/2025   MRI (HEAD, NECK, SPINE) N/A    CT (HEAD, NECK, SPINE) N/A         Suspect due to pneumonia as above   Duoneb q6hr  Continue montelukast   tessalon prn for cough   RVP negative   will transition to PO prednisone as patient's O2 requirement is decreasing 4 = No assist / stand by assistance

## 2025-04-14 NOTE — CHART NOTE - NSCHARTNOTEFT_GEN_A_CORE
patient being seen for confusion, patient denies any complaints.     a.p  64 yo male with pmhx MDS, Diffuse Large B cell lymphoma not on treatment, Cirrhosis 2/2 HCV presenting to the ED with family for confusion.    Hepatic Encephalopathy 2/2 HCV Cirrhosis  -Known hx of Cirrhosis 2/2 HCV, patient supposed to be taking lactulose TID at home, though daughter in law states he takes it only sometimes "when he is constipated"  -Ammonia 222--> improved  -GI consult appreciated    Chest Pain  -Daughter in law reports patient complained of chest pain prior to coming in  -Trop neg x 2  -CTA PE negative    Diffuse Large B cell Lymphoma, MDS   -Not currently on treatment per patient choice  -Splenomegaly on CT likely related   -Monitor labs  -Can see Heme/onc outpatient if desired

## 2025-04-14 NOTE — CONSULT NOTE ADULT - ASSESSMENT
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 GI asked to consult for cirrhosis and hepatic encephalopathy.     #Hep C cirrhosis   #hepatic encephalopathy  #pancytopenia    CT Abdomen and Pelvis w/ IV Cont (04.14.25) showed Cirrhotic liver, splenomegaly but no ascites.   MELD on admission 10 points with 99.1% estimated 90 day survival  - Trend renal function, LFTs, electrolytes, coags daily  - Lactulose titrated to 2-3 soft bowel movements per day, avoid diarrhea.   - Can take up to 2G Tylenol per day.   - Optimize nutrition, High Protein/Low Fat/Low Salt (up to 2G Na/day), avoid raw shellfish, unpasteurized dairy products; avoid eating any raw or undercooked eggs, fish, poultry, or meat  - Varices: EGD, can consider outpatient  - No ascites on imaging   - Outpatient follow up with hepatologist Dr. Humphries  _________________________________________________________________  Assessment and recommendations are final when note is signed by the attending physician.    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 GI asked to consult for cirrhosis and hepatic encephalopathy.     #Hep C cirrhosis   #hepatic encephalopathy  #pancytopenia    CT Abdomen and Pelvis w/ IV Cont (04.14.25) showed Cirrhotic liver, splenomegaly but no ascites.   MELD on admission 10 points with 99.1% estimated 90 day survival  - Trend renal function, LFTs, electrolytes, coags daily  - Lactulose titrated to 2-3 soft bowel movements per day, avoid diarrhea. Can add rifaxamin BID  - Can take up to 2G Tylenol per day.   - Optimize nutrition, High Protein/Low Fat/Low Salt (up to 2G Na/day), avoid raw shellfish, unpasteurized dairy products; avoid eating any raw or undercooked eggs, fish, poultry, or meat  - Varices: EGD, can consider outpatient  - No ascites on imaging   - Outpatient follow up with hepatologist Dr. Humphries  _________________________________________________________________  Assessment and recommendations are final when note is signed by the attending physician.

## 2025-04-15 ENCOUNTER — TRANSCRIPTION ENCOUNTER (OUTPATIENT)
Age: 65
End: 2025-04-15

## 2025-04-15 VITALS
SYSTOLIC BLOOD PRESSURE: 127 MMHG | DIASTOLIC BLOOD PRESSURE: 64 MMHG | RESPIRATION RATE: 18 BRPM | HEART RATE: 87 BPM | OXYGEN SATURATION: 98 % | TEMPERATURE: 98 F

## 2025-04-15 LAB
ALBUMIN SERPL ELPH-MCNC: 2.7 G/DL — LOW (ref 3.3–5.2)
ALP SERPL-CCNC: 98 U/L — SIGNIFICANT CHANGE UP (ref 40–120)
ALT FLD-CCNC: 34 U/L — SIGNIFICANT CHANGE UP
AMMONIA BLD-MCNC: 79 UMOL/L — HIGH (ref 11–55)
ANION GAP SERPL CALC-SCNC: 13 MMOL/L — SIGNIFICANT CHANGE UP (ref 5–17)
AST SERPL-CCNC: 59 U/L — HIGH
BASOPHILS # BLD AUTO: 0.01 K/UL — SIGNIFICANT CHANGE UP (ref 0–0.2)
BASOPHILS NFR BLD AUTO: 0.3 % — SIGNIFICANT CHANGE UP (ref 0–2)
BILIRUB SERPL-MCNC: 1.8 MG/DL — SIGNIFICANT CHANGE UP (ref 0.4–2)
BUN SERPL-MCNC: 17.6 MG/DL — SIGNIFICANT CHANGE UP (ref 8–20)
CALCIUM SERPL-MCNC: 8.2 MG/DL — LOW (ref 8.4–10.5)
CHLORIDE SERPL-SCNC: 109 MMOL/L — HIGH (ref 96–108)
CO2 SERPL-SCNC: 17 MMOL/L — LOW (ref 22–29)
CREAT SERPL-MCNC: 0.47 MG/DL — LOW (ref 0.5–1.3)
EGFR: 114 ML/MIN/1.73M2 — SIGNIFICANT CHANGE UP
EGFR: 114 ML/MIN/1.73M2 — SIGNIFICANT CHANGE UP
EOSINOPHIL # BLD AUTO: 0.05 K/UL — SIGNIFICANT CHANGE UP (ref 0–0.5)
EOSINOPHIL NFR BLD AUTO: 1.6 % — SIGNIFICANT CHANGE UP (ref 0–6)
GLUCOSE SERPL-MCNC: 106 MG/DL — HIGH (ref 70–99)
HCT VFR BLD CALC: 34.4 % — LOW (ref 39–50)
HGB BLD-MCNC: 12 G/DL — LOW (ref 13–17)
IMM GRANULOCYTES # BLD AUTO: 0.01 K/UL — SIGNIFICANT CHANGE UP (ref 0–0.07)
IMM GRANULOCYTES NFR BLD AUTO: 0.3 % — SIGNIFICANT CHANGE UP (ref 0–0.9)
IMMATURE PLATELET FRACTION #: 1.5 K/UL — LOW (ref 3.9–12.5)
IMMATURE PLATELET FRACTION %: 2.2 % — SIGNIFICANT CHANGE UP (ref 1.6–7.1)
LYMPHOCYTES # BLD AUTO: 1.29 K/UL — SIGNIFICANT CHANGE UP (ref 1–3.3)
LYMPHOCYTES NFR BLD AUTO: 41.1 % — SIGNIFICANT CHANGE UP (ref 13–44)
MAGNESIUM SERPL-MCNC: 1.7 MG/DL — SIGNIFICANT CHANGE UP (ref 1.6–2.6)
MCHC RBC-ENTMCNC: 34.6 PG — HIGH (ref 27–34)
MCHC RBC-ENTMCNC: 34.9 G/DL — SIGNIFICANT CHANGE UP (ref 32–36)
MCV RBC AUTO: 99.1 FL — SIGNIFICANT CHANGE UP (ref 80–100)
MONOCYTES # BLD AUTO: 0.42 K/UL — SIGNIFICANT CHANGE UP (ref 0–0.9)
MONOCYTES NFR BLD AUTO: 13.4 % — SIGNIFICANT CHANGE UP (ref 2–14)
NEUTROPHILS # BLD AUTO: 1.36 K/UL — LOW (ref 1.8–7.4)
NEUTROPHILS NFR BLD AUTO: 43.3 % — SIGNIFICANT CHANGE UP (ref 43–77)
NRBC # BLD AUTO: 0 K/UL — SIGNIFICANT CHANGE UP (ref 0–0)
NRBC # FLD: 0 K/UL — SIGNIFICANT CHANGE UP (ref 0–0)
NRBC BLD AUTO-RTO: 0 /100 WBCS — SIGNIFICANT CHANGE UP (ref 0–0)
PLATELET # BLD AUTO: 66 K/UL — LOW (ref 150–400)
PMV BLD: 11.6 FL — SIGNIFICANT CHANGE UP (ref 7–13)
POTASSIUM SERPL-MCNC: 3.8 MMOL/L — SIGNIFICANT CHANGE UP (ref 3.5–5.3)
POTASSIUM SERPL-SCNC: 3.8 MMOL/L — SIGNIFICANT CHANGE UP (ref 3.5–5.3)
PROT SERPL-MCNC: 7.9 G/DL — SIGNIFICANT CHANGE UP (ref 6.6–8.7)
RBC # BLD: 3.47 M/UL — LOW (ref 4.2–5.8)
RBC # FLD: 14.3 % — SIGNIFICANT CHANGE UP (ref 10.3–14.5)
SODIUM SERPL-SCNC: 139 MMOL/L — SIGNIFICANT CHANGE UP (ref 135–145)
WBC # BLD: 3.14 K/UL — LOW (ref 3.8–10.5)
WBC # FLD AUTO: 3.14 K/UL — LOW (ref 3.8–10.5)

## 2025-04-15 PROCEDURE — 83735 ASSAY OF MAGNESIUM: CPT

## 2025-04-15 PROCEDURE — 99285 EMERGENCY DEPT VISIT HI MDM: CPT

## 2025-04-15 PROCEDURE — 99239 HOSP IP/OBS DSCHRG MGMT >30: CPT

## 2025-04-15 PROCEDURE — 71275 CT ANGIOGRAPHY CHEST: CPT | Mod: MC

## 2025-04-15 PROCEDURE — 70450 CT HEAD/BRAIN W/O DYE: CPT | Mod: MC

## 2025-04-15 PROCEDURE — 96376 TX/PRO/DX INJ SAME DRUG ADON: CPT

## 2025-04-15 PROCEDURE — 83690 ASSAY OF LIPASE: CPT

## 2025-04-15 PROCEDURE — 99232 SBSQ HOSP IP/OBS MODERATE 35: CPT

## 2025-04-15 PROCEDURE — 85025 COMPLETE CBC W/AUTO DIFF WBC: CPT

## 2025-04-15 PROCEDURE — 80053 COMPREHEN METABOLIC PANEL: CPT

## 2025-04-15 PROCEDURE — 74177 CT ABD & PELVIS W/CONTRAST: CPT | Mod: MC

## 2025-04-15 PROCEDURE — 96374 THER/PROPH/DIAG INJ IV PUSH: CPT

## 2025-04-15 PROCEDURE — 85610 PROTHROMBIN TIME: CPT

## 2025-04-15 PROCEDURE — 36415 COLL VENOUS BLD VENIPUNCTURE: CPT

## 2025-04-15 PROCEDURE — 85730 THROMBOPLASTIN TIME PARTIAL: CPT

## 2025-04-15 PROCEDURE — 84484 ASSAY OF TROPONIN QUANT: CPT

## 2025-04-15 PROCEDURE — 82140 ASSAY OF AMMONIA: CPT

## 2025-04-15 PROCEDURE — 93005 ELECTROCARDIOGRAM TRACING: CPT

## 2025-04-15 PROCEDURE — 97163 PT EVAL HIGH COMPLEX 45 MIN: CPT

## 2025-04-15 RX ORDER — HALOPERIDOL 10 MG/1
2.5 TABLET ORAL ONCE
Refills: 0 | Status: COMPLETED | OUTPATIENT
Start: 2025-04-15 | End: 2025-04-15

## 2025-04-15 RX ORDER — RIFAXIMIN 550 MG/1
1 TABLET ORAL
Qty: 60 | Refills: 0
Start: 2025-04-15 | End: 2025-05-14

## 2025-04-15 RX ORDER — MAGNESIUM SULFATE 500 MG/ML
2 SYRINGE (ML) INJECTION ONCE
Refills: 0 | Status: COMPLETED | OUTPATIENT
Start: 2025-04-15 | End: 2025-04-15

## 2025-04-15 RX ORDER — LACTULOSE 10 G/15ML
30 SOLUTION ORAL
Qty: 3600 | Refills: 0
Start: 2025-04-15 | End: 2025-05-14

## 2025-04-15 RX ORDER — LACTULOSE 10 G/15ML
30 SOLUTION ORAL
Refills: 0 | DISCHARGE

## 2025-04-15 RX ORDER — MECLIZINE HCL 12.5 MG
1 TABLET ORAL
Refills: 0 | DISCHARGE

## 2025-04-15 RX ADMIN — Medication 25 GRAM(S): at 08:17

## 2025-04-15 RX ADMIN — LACTULOSE 20 GRAM(S): 10 SOLUTION ORAL at 05:33

## 2025-04-15 RX ADMIN — Medication 105 MILLIGRAM(S): at 05:34

## 2025-04-15 RX ADMIN — HALOPERIDOL 2.5 MILLIGRAM(S): 10 TABLET ORAL at 02:23

## 2025-04-15 RX ADMIN — Medication 40 MILLIGRAM(S): at 05:36

## 2025-04-15 RX ADMIN — Medication 650 MILLIGRAM(S): at 05:35

## 2025-04-15 RX ADMIN — LACTULOSE 20 GRAM(S): 10 SOLUTION ORAL at 13:42

## 2025-04-15 NOTE — DISCHARGE NOTE PROVIDER - PROVIDER TOKENS
PROVIDER:[TOKEN:[[id number]]],FREE:[LAST:[primary care],PHONE:[(   )    -],FAX:[(   )    -],ADDRESS:[pcp]]

## 2025-04-15 NOTE — PROGRESS NOTE ADULT - SUBJECTIVE AND OBJECTIVE BOX
Chief Complaint:  Patient is a 65y old  Male who presents with a chief complaint of Hepatic Encephalopathy (14 Apr 2025 09:16)      HPI/ 24 hr events: Patient seen and examined at bedside. Pt more awake from yesterday but still confused, unable to answer questions correctly. Had BMs on lactulose. Vitals are overall stable, no leukocytosis, LFTs stable.       REVIEW OF SYSTEMS:   General: Negative  HEENT: Negative  CV: Negative  Respiratory: Negative  GI: See HPI  : Negative  MSK: Negative  Hematologic: Negative  Skin: Negative    MEDICATIONS:   MEDICATIONS  (STANDING):  lactulose Syrup 20 Gram(s) Oral four times a day  pantoprazole    Tablet 40 milliGRAM(s) Oral before breakfast  rifAXIMin 550 milliGRAM(s) Oral two times a day  thiamine IVPB 500 milliGRAM(s) IV Intermittent every 8 hours    MEDICATIONS  (PRN):  acetaminophen     Tablet .. 650 milliGRAM(s) Oral every 6 hours PRN Temp greater or equal to 38C (100.4F), Mild Pain (1 - 3)  aluminum hydroxide/magnesium hydroxide/simethicone Suspension 30 milliLiter(s) Oral every 4 hours PRN Dyspepsia  melatonin 3 milliGRAM(s) Oral at bedtime PRN Insomnia  ondansetron Injectable 4 milliGRAM(s) IV Push every 8 hours PRN Nausea and/or Vomiting            DIET:  Diet, DASH/TLC:   Sodium & Cholesterol Restricted  1500mL Fluid Restriction ([id number]) (04-14-25 @ 07:34) [Active]          ALLERGIES:   Allergies    No Known Allergies    Intolerances        VITAL SIGNS:   Vital Signs Last 24 Hrs  T(C): 36.7 (15 Apr 2025 08:37), Max: 36.7 (14 Apr 2025 15:37)  T(F): 98.1 (15 Apr 2025 08:37), Max: 98.1 (15 Apr 2025 08:37)  HR: 86 (15 Apr 2025 08:37) (86 - 103)  BP: 149/82 (15 Apr 2025 08:37) (126/80 - 149/95)  BP(mean): 100 (14 Apr 2025 11:17) (100 - 100)  RR: 18 (15 Apr 2025 08:37) (18 - 18)  SpO2: 96% (15 Apr 2025 08:37) (96% - 100%)    Parameters below as of 15 Apr 2025 08:37  Patient On (Oxygen Delivery Method): room air      I&O's Summary      PHYSICAL EXAM:   GENERAL:  No acute distress  HEENT:  NC/AT, conjunctiva clear, sclera anicteric  CHEST:  No increased effort  HEART:  Regular rate  ABDOMEN:  Soft, non-tender, non-distended, normoactive bowel sounds, no rebound or guarding  EXTREMITIES: No edema  SKIN:  Warm, dry  NEURO:  Confused     LABS:                        12.0   3.14  )-----------( 66       ( 15 Apr 2025 03:59 )             34.4     Hemoglobin: 12.0 g/dL (04-15-25 @ 03:59)  Hemoglobin: 11.3 g/dL (04-13-25 @ 18:35)    04-15    139  |  109[H]  |  17.6  ----------------------------<  106[H]  3.8   |  17.0[L]  |  0.47[L]    Ca    8.2[L]      15 Apr 2025 03:59  Mg     1.7     04-15    TPro  7.9  /  Alb  2.7[L]  /  TBili  1.8  /  DBili  x   /  AST  59[H]  /  ALT  34  /  AlkPhos  98  04-15    LIVER FUNCTIONS - ( 15 Apr 2025 03:59 )  Alb: 2.7 g/dL / Pro: 7.9 g/dL / ALK PHOS: 98 U/L / ALT: 34 U/L / AST: 59 U/L / GGT: x             PT/INR - ( 13 Apr 2025 18:35 )   PT: 15.5 sec;   INR: 1.34 ratio         PTT - ( 13 Apr 2025 18:35 )  PTT:35.7 sec                                        RADIOLOGY & ADDITIONAL STUDIES:      ACC: [id number] EXAM:  CT ABDOMEN AND PELVIS IC   ORDERED BY: EDDIE CHIU     PROCEDURE DATE:  04/14/2025          INTERPRETATION:  PROCEDURE INFORMATION:  Exam: CT Abdomen And Pelvis  Exam date and time: 4/14/2025 1:06 AM  Age: 65 years old  Clinical indication: Epigastric abd pain    TECHNIQUE:  Imaging protocol: Computed tomography of the abdomen and pelvis with   contrast. Sagittal and coronal reconstructions performed. Exam degraded   by motion unsharpness.  Contrast material: IV: IV CONTRAST DOCUMENTED IN UNLINKED CONCURRENT EXAM;    COMPARISON:  CT ANGIO CHEST PULMONARY ARTERY 4/14/2025 1:06 AM    FINDINGS:  Liver: Surface irregularity and volume loss c/w cirrhosis.  Gallbladder and biliary ducts: Normal. No calcified stones. No ductal   dilation.  Pancreas: Unremarkable.  Spleen: Splenomegaly (14.9 cm max sagittal).  Adrenal glands: Normal. No mass.  Kidneys and ureters: Normal. No hydronephrosis.  Stomach and bowel: RLQ postsurgical changes. No bowel wall thickening or   intestinal obstruction. No pneumatosis or portal/mesenteric venous gas.   Nonvisualized appendix. No appendicitis.    Intraperitoneal space: No ascites, pneumoperitoneum or abscess.  Vasculature: Atherosclerotic changes.  Lymph nodes: Unremarkable.  Urinary bladder: Unremarkable as visualized.  Reproductive: Mild prostate enlargement  Bones/joints: Benign sclerotic lesion in the proximal left femur. Lower   thoracic/lumbar spondylosis  Soft tissues: Unremarkable.    IMPRESSION:  1.  Limited exam , degraded by motion unsharpness.  2.  No acute abdominal/pelvic pathology.  3.  Cirrhotic liver, splenomegaly  A preliminary report was given by [facility] RADIOLOGY: GLADYS CHANG M.D    --- End of Report ---            PJ DANIELS MD; Attending Radiologist  This document has been electronically signed. Apr 14 2025  6:47AM  04-14-25 @ 01:38       Chief Complaint:  Patient is a 65y old  Male who presents with a chief complaint of Hepatic Encephalopathy (14 Apr 2025 09:16)      HPI/ 24 hr events: Patient seen and examined at bedside. Pt more awake from yesterday but still confused, unable to answer questions correctly. Had BMs on lactulose. Vitals are overall stable, no leukocytosis, LFTs stable.       REVIEW OF SYSTEMS:   General: Negative  HEENT: Negative  CV: Negative  Respiratory: Negative  GI: See HPI  : Negative  MSK: Negative  Hematologic: Negative  Skin: Negative    MEDICATIONS:   MEDICATIONS  (STANDING):  lactulose Syrup 20 Gram(s) Oral four times a day  pantoprazole    Tablet 40 milliGRAM(s) Oral before breakfast  rifAXIMin 550 milliGRAM(s) Oral two times a day  thiamine IVPB 500 milliGRAM(s) IV Intermittent every 8 hours    MEDICATIONS  (PRN):  acetaminophen     Tablet .. 650 milliGRAM(s) Oral every 6 hours PRN Temp greater or equal to 38C (100.4F), Mild Pain (1 - 3)  aluminum hydroxide/magnesium hydroxide/simethicone Suspension 30 milliLiter(s) Oral every 4 hours PRN Dyspepsia  melatonin 3 milliGRAM(s) Oral at bedtime PRN Insomnia  ondansetron Injectable 4 milliGRAM(s) IV Push every 8 hours PRN Nausea and/or Vomiting            DIET:  Diet, DASH/TLC:   Sodium & Cholesterol Restricted  1500mL Fluid Restriction ([id number]) (04-14-25 @ 07:34) [Active]          ALLERGIES:   Allergies    No Known Allergies    Intolerances        VITAL SIGNS:   Vital Signs Last 24 Hrs  T(C): 36.7 (15 Apr 2025 08:37), Max: 36.7 (14 Apr 2025 15:37)  T(F): 98.1 (15 Apr 2025 08:37), Max: 98.1 (15 Apr 2025 08:37)  HR: 86 (15 Apr 2025 08:37) (86 - 103)  BP: 149/82 (15 Apr 2025 08:37) (126/80 - 149/95)  BP(mean): 100 (14 Apr 2025 11:17) (100 - 100)  RR: 18 (15 Apr 2025 08:37) (18 - 18)  SpO2: 96% (15 Apr 2025 08:37) (96% - 100%)    Parameters below as of 15 Apr 2025 08:37  Patient On (Oxygen Delivery Method): room air      I&O's Summary      PHYSICAL EXAM:   GENERAL:  No acute distress  HEENT:  NC/AT, conjunctiva clear, sclera anicteric  CHEST:  No increased effort  HEART:  Regular rate  ABDOMEN:  Soft, non-tender, non-distended, normoactive bowel sounds, no rebound or guarding  EXTREMITIES: No edema  SKIN:  Warm, dry  NEURO:  Confused     LABS:                        12.0   3.14  )-----------( 66       ( 15 Apr 2025 03:59 )             34.4     Hemoglobin: 12.0 g/dL (04-15-25 @ 03:59)  Hemoglobin: 11.3 g/dL (04-13-25 @ 18:35)    04-15    139  |  109[H]  |  17.6  ----------------------------<  106[H]  3.8   |  17.0[L]  |  0.47[L]    Ca    8.2[L]      15 Apr 2025 03:59  Mg     1.7     04-15    TPro  7.9  /  Alb  2.7[L]  /  TBili  1.8  /  DBili  x   /  AST  59[H]  /  ALT  34  /  AlkPhos  98  04-15    LIVER FUNCTIONS - ( 15 Apr 2025 03:59 )  Alb: 2.7 g/dL / Pro: 7.9 g/dL / ALK PHOS: 98 U/L / ALT: 34 U/L / AST: 59 U/L / GGT: x             PT/INR - ( 13 Apr 2025 18:35 )   PT: 15.5 sec;   INR: 1.34 ratio         PTT - ( 13 Apr 2025 18:35 )  PTT:35.7 sec                                        RADIOLOGY & ADDITIONAL STUDIES:      ACC: [id number] EXAM:  CT ABDOMEN AND PELVIS IC   ORDERED BY: EDDIE CHIU     PROCEDURE DATE:  04/14/2025          INTERPRETATION:  PROCEDURE INFORMATION:  Exam: CT Abdomen And Pelvis  Exam date and time: 4/14/2025 1:06 AM  Age: 65 years old  Clinical indication: Epigastric abd pain    TECHNIQUE:  Imaging protocol: Computed tomography of the abdomen and pelvis with   contrast. Sagittal and coronal reconstructions performed. Exam degraded   by motion unsharpness.  Contrast material: IV: IV CONTRAST DOCUMENTED IN UNLINKED CONCURRENT EXAM;    COMPARISON:  CT ANGIO CHEST PULMONARY ARTERY 4/14/2025 1:06 AM    FINDINGS:  Liver: Surface irregularity and volume loss c/w cirrhosis.  Gallbladder and biliary ducts: Normal. No calcified stones. No ductal   dilation.  Pancreas: Unremarkable.  Spleen: Splenomegaly (14.9 cm max sagittal).  Adrenal glands: Normal. No mass.  Kidneys and ureters: Normal. No hydronephrosis.  Stomach and bowel: RLQ postsurgical changes. No bowel wall thickening or   intestinal obstruction. No pneumatosis or portal/mesenteric venous gas.   Nonvisualized appendix. No appendicitis.    Intraperitoneal space: No ascites, pneumoperitoneum or abscess.  Vasculature: Atherosclerotic changes.  Lymph nodes: Unremarkable.  Urinary bladder: Unremarkable as visualized.  Reproductive: Mild prostate enlargement  Bones/joints: Benign sclerotic lesion in the proximal left femur. Lower   thoracic/lumbar spondylosis  Soft tissues: Unremarkable.    IMPRESSION:  1.  Limited exam , degraded by motion unsharpness.  2.  No acute abdominal/pelvic pathology.  3.  Cirrhotic liver, splenomegaly  A preliminary report was given by [facility] RADIOLOGY: GLADYS CHANG M.D    --- End of Report ---            PJ DANIELS MD; Attending Radiologist  This document has been electronically signed. Apr 14 2025  6:47AM  04-14-25 @ 01:38

## 2025-04-15 NOTE — DISCHARGE NOTE PROVIDER - CARE PROVIDER_API CALL
Kadi Humphries  Transplant Hepatology  [address], [address]  [place] [address]  Phone: [phone]  Fax: [phone]  Follow Up Time:     primary care,   pcp  Phone: (   )    -  Fax: (   )    -  Follow Up Time:

## 2025-04-15 NOTE — DISCHARGE NOTE PROVIDER - NSDCMRMEDTOKEN_GEN_ALL_CORE_FT
lactulose 10 g/15 mL oral syrup: 30 milliliter(s) orally 4 times a day  Nucynta 75 mg oral tablet: 1 tab(s) orally 3 times a day as needed for pain  pantoprazole 40 mg oral delayed release tablet: 1 tab(s) orally once a day (before a meal)  rifAXIMin 550 mg oral tablet: 1 tab(s) orally 2 times a day  thiamine 100 mg oral tablet: 1 tab(s) orally once a day

## 2025-04-15 NOTE — DISCHARGE NOTE PROVIDER - HOSPITAL COURSE
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    Hepatic Encephalopathy 2/2 HCV Cirrhosis  -Known hx of Cirrhosis 2/2 HCV, patient supposed to be taking lactulose TID at home, though daughter in law states he takes it only sometimes "when he is constipated"  -Ammonia 222  -CT ab/pel showing cirrhosis and splenomegaly  -CT head negative  -GI consult, will need Hepatology follow up outpatient  l;actyulose atc    Chest Pain  -Trop neg x 2  -CTA PE negative    Diffuse Large B cell Lymphoma, MDS   -Not currently on treatment per patient choice  -Splenomegaly on CT   -Can see Heme/onc outpatient if desired    ammonia improved  seen by pt   medically stable

## 2025-04-15 NOTE — PROGRESS NOTE ADULT - NS ATTEND AMEND GEN_ALL_CORE FT
64 yo M with PMH HCV cirrhosis c/b HE who presents with AMS. Mild improvement in mental status, but no BMs today. Asterixis on exam. Patient reports he feels confused as well. PLEASE give lactulose with goal of 3 BMs per day- has not had a BM today so would give lactulose now. Please rifaximin BID for HE as well.

## 2025-04-15 NOTE — DISCHARGE NOTE PROVIDER - ATTENDING DISCHARGE PHYSICAL EXAMINATION:
General: Age-appearing, nad  Head: Normocephalic, atraumatic  ENMT: EOMI, neck supple  Cardiovascular: +S1, S2; Regular rate and rhythm, no murmurs, rubs, gallops  Respiratory: CTA BL, no wheezes, rales, rhonchi  Gastrointestinal: Abdomen soft, non-tender, +BS in all 4 quadrants  Extremities: No clubbing, cyanosis, or edema  Vascular: 2+ pulses, cap refill < 2 seconds  Neuro: awake and alert   Musculoskeletal: Normal tone, no deformities  Skin: Warm, dry; no acute rash seen

## 2025-04-15 NOTE — DISCHARGE NOTE PROVIDER - NSDCCPCAREPLAN_GEN_ALL_CORE_FT
PRINCIPAL DISCHARGE DIAGNOSIS  Diagnosis: Hepatic encephalopathy  Assessment and Plan of Treatment:       SECONDARY DISCHARGE DIAGNOSES  Diagnosis: Thiamine deficiency, unspecified  Assessment and Plan of Treatment:

## 2025-04-15 NOTE — PROGRESS NOTE ADULT - ASSESSMENT
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 GI asked to consult for cirrhosis and hepatic encephalopathy.     #Hep C cirrhosis   #hepatic encephalopathy  #pancytopenia    CT Abdomen and Pelvis w/ IV Cont (04.14.25) showed Cirrhotic liver, splenomegaly but no ascites.   MELD on admission 10 points with 99.1% estimated 90 day survival  - Trend renal function, LFTs, electrolytes, coags daily  - Lactulose titrated to 2-3 soft bowel movements per day, avoid diarrhea. Can add rifaxamin BID  - No need to trend ammonia   - Can take up to 2G Tylenol per day.   - Optimize nutrition, High Protein/Low Fat/Low Salt (up to 2G Na/day), avoid raw shellfish, unpasteurized dairy products; avoid eating any raw or undercooked eggs, fish, poultry, or meat  - Varices: EGD, can consider outpatient  - No ascites on imaging   - Outpatient follow up with hepatologist Dr. Humphries  _________________________________________________________________  Assessment and recommendations are final when note is signed by the attending physician.  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 GI asked to consult for cirrhosis and hepatic encephalopathy.     #Hep C cirrhosis   #hepatic encephalopathy  #pancytopenia    CT Abdomen and Pelvis w/ IV Cont (04.14.25) showed Cirrhotic liver, splenomegaly but no ascites.   MELD on admission 10 points with 99.1% estimated 90 day survival  - Trend renal function, LFTs, electrolytes, coags daily  - Lactulose titrated to 3 soft bowel movements per day, avoid diarrhea. Can add rifaxamin BID  - No need to trend ammonia   - Can take up to 2G Tylenol per day.   - Optimize nutrition, High Protein/Low Fat/Low Salt (up to 2G Na/day), avoid raw shellfish, unpasteurized dairy products; avoid eating any raw or undercooked eggs, fish, poultry, or meat  - Varices: EGD, can consider outpatient  - No ascites on imaging   - Outpatient follow up with hepatologist Dr. Humphries  _________________________________________________________________  Assessment and recommendations are final when note is signed by the attending physician.

## 2025-04-15 NOTE — DISCHARGE NOTE NURSING/CASE MANAGEMENT/SOCIAL WORK - FINANCIAL ASSISTANCE
[facility] provides services at a reduced cost to those who are determined to be eligible through [facility]’s financial assistance program. Information regarding [facility]’s financial assistance program can be found by going to [email or web address] or by calling [phone].

## 2025-04-15 NOTE — DISCHARGE NOTE NURSING/CASE MANAGEMENT/SOCIAL WORK - PATIENT PORTAL LINK FT
You can access the FollowMyHealth Patient Portal offered by [facility] by registering at the following website: [email or web address]. By joining [employer]s FollowMyHealth portal, you will also be able to view your health information using other applications (apps) compatible with our system.

## 2025-04-16 PROBLEM — K74.60 UNSPECIFIED CIRRHOSIS OF LIVER: Chronic | Status: ACTIVE | Noted: 2025-04-14

## 2025-04-16 PROBLEM — C83.30 DIFFUSE LARGE B-CELL LYMPHOMA, UNSPECIFIED SITE: Chronic | Status: ACTIVE | Noted: 2025-04-14

## 2025-04-16 PROBLEM — B19.20 UNSPECIFIED VIRAL HEPATITIS C WITHOUT HEPATIC COMA: Chronic | Status: ACTIVE | Noted: 2025-04-14

## 2025-07-28 NOTE — PATIENT PROFILE ADULT - FALL HARM RISK - HARM RISK INTERVENTIONS
PA submitted for Zepbound 2.5mg  Key: [id number]  Approved through 7/28/2026   Assistance OOB with selected safe patient handling equipment/Communicate Risk of Fall with Harm to all staff/Monitor for mental status changes/Move patient closer to nurses' station/Reinforce activity limits and safety measures with patient and family/Reorient to person, place and time as needed/Tailored Fall Risk Interventions/Toileting schedule using arm’s reach rule for commode and bathroom/Use of alarms - bed, chair and/or voice tab/Visual Cue: Yellow wristband and red socks/Bed in lowest position, wheels locked, appropriate side rails in place/Call bell, personal items and telephone in reach/Instruct patient to call for assistance before getting out of bed or chair/Non-slip footwear when patient is out of bed/Dora to call system/Physically safe environment - no spills, clutter or unnecessary equipment/Purposeful Proactive Rounding/Room/bathroom lighting operational, light cord in reach

## 2025-08-13 ENCOUNTER — APPOINTMENT (OUTPATIENT)
Dept: GASTROENTEROLOGY | Facility: CLINIC | Age: 65
End: 2025-08-13